# Patient Record
Sex: MALE | Race: WHITE | NOT HISPANIC OR LATINO
[De-identification: names, ages, dates, MRNs, and addresses within clinical notes are randomized per-mention and may not be internally consistent; named-entity substitution may affect disease eponyms.]

---

## 2024-05-14 PROBLEM — Z00.00 ENCOUNTER FOR PREVENTIVE HEALTH EXAMINATION: Status: ACTIVE | Noted: 2024-05-14

## 2024-05-17 ENCOUNTER — APPOINTMENT (OUTPATIENT)
Dept: SURGERY | Facility: CLINIC | Age: 79
End: 2024-05-17

## 2024-05-31 ENCOUNTER — APPOINTMENT (OUTPATIENT)
Dept: SURGERY | Facility: CLINIC | Age: 79
End: 2024-05-31
Payer: COMMERCIAL

## 2024-05-31 VITALS
BODY MASS INDEX: 23.63 KG/M2 | HEIGHT: 66 IN | OXYGEN SATURATION: 98 % | HEART RATE: 73 BPM | WEIGHT: 147 LBS | SYSTOLIC BLOOD PRESSURE: 130 MMHG | DIASTOLIC BLOOD PRESSURE: 70 MMHG

## 2024-05-31 PROCEDURE — 99204 OFFICE O/P NEW MOD 45 MIN: CPT

## 2024-06-07 NOTE — HISTORY OF PRESENT ILLNESS
[de-identified] : This patient has a several month history of an enlarging right inguinal hernia.It has remained reducible.  He denies Any pain.  He does report some intermittent issues with constipation and he is scheduled to have a colonoscopy in the next several weeks.

## 2024-06-07 NOTE — PHYSICAL EXAM
[de-identified] : His abdomen is soft, nondistended and nontender.  He has a reducible right inguinal hernia.  There is no palpable left inguinal hernia.

## 2024-06-07 NOTE — ASSESSMENT
[FreeTextEntry1] : This patient has an enlarging right inguinal hernia that he wishes to have repaired.  He has a couple of issues that need to be addressed prior to surgery.  He is going to have a colonoscopy to ensure that there are no issues that need to be addressed with his colon due to his constipation.  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  We did discuss scheduling him for an open right inguinal hernia repair with mesh.  Risks and benefits including but not limited to bleeding, infection, recurrence, injury to the testicle were discussed with the patient who understands and agrees to the surgery.

## 2024-07-18 ENCOUNTER — NON-APPOINTMENT (OUTPATIENT)
Age: 79
End: 2024-07-18

## 2024-08-01 ENCOUNTER — NON-APPOINTMENT (OUTPATIENT)
Age: 79
End: 2024-08-01

## 2024-08-02 ENCOUNTER — APPOINTMENT (OUTPATIENT)
Dept: SURGERY | Facility: CLINIC | Age: 79
End: 2024-08-02

## 2024-08-02 ENCOUNTER — NON-APPOINTMENT (OUTPATIENT)
Age: 79
End: 2024-08-02

## 2024-08-02 VITALS
HEART RATE: 70 BPM | OXYGEN SATURATION: 96 % | WEIGHT: 147 LBS | HEIGHT: 66 IN | BODY MASS INDEX: 23.63 KG/M2 | SYSTOLIC BLOOD PRESSURE: 128 MMHG | TEMPERATURE: 97 F | DIASTOLIC BLOOD PRESSURE: 80 MMHG

## 2024-08-02 DIAGNOSIS — C20 MALIGNANT NEOPLASM OF RECTUM: ICD-10-CM

## 2024-08-02 PROCEDURE — 99214 OFFICE O/P EST MOD 30 MIN: CPT

## 2024-08-05 NOTE — HISTORY OF PRESENT ILLNESS
[de-identified] : Is a very pleasant patient who we originally seeing for an inguinal hernia that was becoming symptomatic.  He was noted to have constipation and 1 episode of rectal bleeding several months ago.  He had been evaluated and was not sure about getting colonoscopy but we encouraged him to proceed with a colonoscopy.  On colonoscopy he was found to have a rectal cancer.  This is biopsy-proven.  No further staging has been done at this time.

## 2024-08-05 NOTE — ASSESSMENT
[FreeTextEntry1] : This patient was being worked up for an inguinal hernia and is now found to have rectal cancer.  We set him up for staging with a PET CT scan.  He cannot have a pelvic MRI at this point due to his pacemaker.  We did briefly discuss the management of rectal cancer with the need for systemic and local regional staging as well as the possibility of preoperative chemotherapy or chemoradiation.  We have set him up to see Dr. Novoa from colorectal surgery to discuss treatment options.  I will be happy to see the patient back in the future to discuss management of his hernia.

## 2024-08-05 NOTE — HISTORY OF PRESENT ILLNESS
[de-identified] : Is a very pleasant patient who we originally seeing for an inguinal hernia that was becoming symptomatic.  He was noted to have constipation and 1 episode of rectal bleeding several months ago.  He had been evaluated and was not sure about getting colonoscopy but we encouraged him to proceed with a colonoscopy.  On colonoscopy he was found to have a rectal cancer.  This is biopsy-proven.  No further staging has been done at this time.

## 2024-08-06 ENCOUNTER — LABORATORY RESULT (OUTPATIENT)
Age: 79
End: 2024-08-06

## 2024-08-12 ENCOUNTER — RESULT REVIEW (OUTPATIENT)
Age: 79
End: 2024-08-12

## 2024-08-13 ENCOUNTER — APPOINTMENT (OUTPATIENT)
Dept: SURGERY | Facility: CLINIC | Age: 79
End: 2024-08-13